# Patient Record
Sex: MALE | Race: WHITE | Employment: FULL TIME | URBAN - METROPOLITAN AREA
[De-identification: names, ages, dates, MRNs, and addresses within clinical notes are randomized per-mention and may not be internally consistent; named-entity substitution may affect disease eponyms.]

---
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
## 2019-01-08 ENCOUNTER — HOSPITAL ENCOUNTER (OUTPATIENT)
Dept: GENERAL RADIOLOGY | Age: 19
Discharge: HOME | End: 2019-01-10
Payer: COMMERCIAL

## 2019-01-08 ENCOUNTER — OFFICE VISIT (OUTPATIENT)
Dept: ORTHOPEDIC SURGERY | Age: 19
End: 2019-01-08
Payer: COMMERCIAL

## 2019-01-08 VITALS
DIASTOLIC BLOOD PRESSURE: 73 MMHG | RESPIRATION RATE: 16 BRPM | HEART RATE: 55 BPM | WEIGHT: 170 LBS | SYSTOLIC BLOOD PRESSURE: 119 MMHG | BODY MASS INDEX: 23.03 KG/M2 | HEIGHT: 72 IN

## 2019-01-08 DIAGNOSIS — S72.352K CLOSED DISPLACED COMMINUTED FRACTURE OF SHAFT OF LEFT FEMUR WITH NONUNION, SUBSEQUENT ENCOUNTER: ICD-10-CM

## 2019-01-08 DIAGNOSIS — S72.352K CLOSED DISPLACED COMMINUTED FRACTURE OF SHAFT OF LEFT FEMUR WITH NONUNION, SUBSEQUENT ENCOUNTER: Primary | ICD-10-CM

## 2019-01-08 DIAGNOSIS — S72.352G CLOSED DISPLACED COMMINUTED FRACTURE OF SHAFT OF LEFT FEMUR WITH DELAYED HEALING, SUBSEQUENT ENCOUNTER: ICD-10-CM

## 2019-01-08 PROCEDURE — 99213 OFFICE O/P EST LOW 20 MIN: CPT | Performed by: ORTHOPAEDIC SURGERY

## 2019-01-08 PROCEDURE — G0463 HOSPITAL OUTPT CLINIC VISIT: HCPCS | Performed by: ORTHOPAEDIC SURGERY

## 2019-01-08 PROCEDURE — 99212 OFFICE O/P EST SF 10 MIN: CPT | Performed by: ORTHOPAEDIC SURGERY

## 2019-01-08 PROCEDURE — 73552 X-RAY EXAM OF FEMUR 2/>: CPT

## 2019-01-14 PROBLEM — S72.302G: Status: ACTIVE | Noted: 2018-03-29

## 2020-08-17 ENCOUNTER — OFFICE VISIT (OUTPATIENT)
Dept: PRIMARY CARE CLINIC | Age: 20
End: 2020-08-17
Payer: COMMERCIAL

## 2020-08-17 ENCOUNTER — HOSPITAL ENCOUNTER (OUTPATIENT)
Age: 20
Discharge: HOME | End: 2020-08-19
Payer: COMMERCIAL

## 2020-08-17 VITALS
TEMPERATURE: 98.9 F | HEART RATE: 61 BPM | OXYGEN SATURATION: 98 % | BODY MASS INDEX: 25.06 KG/M2 | HEIGHT: 72 IN | SYSTOLIC BLOOD PRESSURE: 130 MMHG | RESPIRATION RATE: 18 BRPM | WEIGHT: 185 LBS | DIASTOLIC BLOOD PRESSURE: 84 MMHG

## 2020-08-17 PROCEDURE — U0003 INFECTIOUS AGENT DETECTION BY NUCLEIC ACID (DNA OR RNA); SEVERE ACUTE RESPIRATORY SYNDROME CORONAVIRUS 2 (SARS-COV-2) (CORONAVIRUS DISEASE [COVID-19]), AMPLIFIED PROBE TECHNIQUE, MAKING USE OF HIGH THROUGHPUT TECHNOLOGIES AS DESCRIBED BY CMS-2020-01-R: HCPCS

## 2020-08-17 PROCEDURE — 99213 OFFICE O/P EST LOW 20 MIN: CPT | Performed by: PHYSICIAN ASSISTANT

## 2020-08-17 PROCEDURE — 1036F TOBACCO NON-USER: CPT | Performed by: PHYSICIAN ASSISTANT

## 2020-08-17 PROCEDURE — G8427 DOCREV CUR MEDS BY ELIG CLIN: HCPCS | Performed by: PHYSICIAN ASSISTANT

## 2020-08-17 PROCEDURE — G8419 CALC BMI OUT NRM PARAM NOF/U: HCPCS | Performed by: PHYSICIAN ASSISTANT

## 2020-08-17 NOTE — PROGRESS NOTES
Chief Complaint:       Emesis (keeping down water) and Abdominal Cramping (diarrhea, improving, wasn't covid tested)      History of Present Illness   Source of history provided by: patient. Mariam Rubio is a 23 y.o. old male who has a past medical history of:   Past Medical History:   Diagnosis Date    Closed fracture of shaft of left femur ([address] 3/29/2018   Presents to the walk in clinic for complaints of persistent nausea, vomiting, decreased appetite, and diffuse crampy abdominal pain x 3 days. Pt was seen yesterday at [facility] and prescribed 4 mg Zofran tablets. He has been taking these with minimal relief. He states he is able to keep down water but unable to keep down food. Pt reports diarrhea at the start of his illness that has resolved. States vomiting episodes have become less frequent. Denies any fever, bloody stools, hematochezia, CP, SOB, dysuria, hematuria, HA, sore throat, rash, or lethargy. Denies any history of international travel in the past 14 days. Denies any contact with any individuals with known COVID-19 infection or under investigation for COVID-19 infection. Relevant PMH: No pertinent PMH. Smoking history:  He reports that he has never smoked. He has never used smokeless tobacco.     He has had no known ill contacts. Treatment to date: pepto bismol. Travel screen completed:  Yes        ROS    Unless otherwise stated in this report or unable to obtain because of the patient's clinical or mental status as evidenced by the medical record, this patients's positive and negative responses for Review of Systems, constitutional, psych, eyes, ENT, cardiovascular, respiratory, gastrointestinal, neurological, genitourinary, musculoskeletal, integument systems and systems related to the presenting problem are either stated in the preceding or were not pertinent or were negative for the symptoms and/or complaints related to the medical problem.     Past Medical History:   Past Surgical History:   Procedure Laterality Date    HERNIA REPAIR Left 2010    scrotal hernia surgery    VT OPEN RX FEMUR FX+INTRAMED SUDARSHAN Left 3/28/2018    FEMUR IM NAIL SUDARSHAN INSERTION performed by Rodrigo Monroy DO at [place] [address] History:  reports that he has never smoked. He has never used smokeless tobacco. He reports that he does not drink alcohol or use drugs. Family History: family history is not on file. Allergies: Patient has no known allergies. Physical Exam         VS:  /84   Pulse 61   Temp 98.9 °F (37.2 °C) (Oral)   Resp 18   Ht 6' (1.829 m)   Wt 185 lb (83.9 kg)   SpO2 98%   BMI 25.09 kg/m²    Oxygen Saturation Interpretation: Normal.    General Appearance/Constitutional:  Alert, development consistent with age, NAD. HEENT:  NCAT. MMMP  Lungs: CTAB without wheezing, rales, or rhonchi. Heart:  RRR, no murmurs, rubs, or gallops. Abdomen:  General Appearance: No obvious trauma or bruising. No rashes or lesions. Bowel sounds: BS+x4       Distension:  No distension. Tenderness: Minimal epigastric TTP. Otherwise nontender without guarding, rebound, or rigidity. Liver/Spleen: Non-tender and no hepatosplenomegaly. Pulsatile Mass: None noted. Back: CVA Tenderness: No bilateral tenderness or bruising. Skin:  Normal turgor. Warm, dry, without visible rash, unless noted elsewhere. Neurological:  Orientation age-appropriate. Motor functions intact. Lab / Imaging Results   (All laboratory and radiology results have been personally reviewed by myself)  Labs:  No results found for this visit on 08/17/20. Imaging: All Radiology results interpreted by Radiologist unless otherwise noted. Assessment / Plan     Impression(s):  1. Viral gastroenteritis      Disposition:  Disposition: Discharge to home. Pt with slowly improving gastroenteritis.  Vitals are stable, nonsurgical abdomen on exam. Advised that his illness is likely viral and should resolve with time and conservative measures. COVID-19 testing obtained and pending, will call with results once available. Advised cautionary self-quarantine in the interim. Advised to continue oral Zofran and advised he can max out at 8 mg per dose no more than every 8 hours. Increase fluids and rest. Clear liquids progressing to [employer] as tolerated. Advise f/u with PCP in 3-5 days if symptoms persist. ED sooner if symptoms worsen or change. ED immediately with the development of high or refractory fever, severe or worsening abdominal pain, hematochezia, coffee-ground emesis, bloody stools, lethargy, CP, or SOB. Pt states understanding and is in agreement with this care plan. All questions answered. This visit was provided as a focused evaluation during the [place] -19 pandemic/national emergency.  A comprehensive review of all previous patient history and testing was not conducted.  Pertinent findings were elicited during the visit.

## 2020-08-19 LAB
SARS-COV-2: NOT DETECTED
SOURCE: NORMAL

## 2021-01-11 ENCOUNTER — APPOINTMENT (OUTPATIENT)
Dept: CT IMAGING | Age: 21
End: 2021-01-11

## 2021-01-11 ENCOUNTER — HOSPITAL ENCOUNTER (EMERGENCY)
Age: 21
Discharge: HOME | End: 2021-01-11

## 2021-01-11 VITALS
SYSTOLIC BLOOD PRESSURE: 132 MMHG | OXYGEN SATURATION: 100 % | WEIGHT: 185 LBS | TEMPERATURE: 97.5 F | HEART RATE: 70 BPM | DIASTOLIC BLOOD PRESSURE: 63 MMHG | RESPIRATION RATE: 16 BRPM | BODY MASS INDEX: 25.06 KG/M2 | HEIGHT: 72 IN

## 2021-01-11 DIAGNOSIS — R11.2 NAUSEA AND VOMITING, INTRACTABILITY OF VOMITING NOT SPECIFIED, UNSPECIFIED VOMITING TYPE: Primary | ICD-10-CM

## 2021-01-11 LAB
ALBUMIN SERPL-MCNC: 4.9 G/DL (ref 3.5–5.2)
ALP BLD-CCNC: 87 U/L (ref 40–129)
ALT SERPL-CCNC: 40 U/L (ref 0–40)
ANION GAP SERPL CALCULATED.3IONS-SCNC: 10 MMOL/L (ref 7–16)
AST SERPL-CCNC: 21 U/L (ref 0–39)
BACTERIA: ABNORMAL /HPF
BASOPHILS ABSOLUTE: 0.04 E9/L (ref 0–0.2)
BASOPHILS RELATIVE PERCENT: 0.4 % (ref 0–2)
BILIRUB SERPL-MCNC: 2.5 MG/DL (ref 0–1.2)
BILIRUBIN URINE: NEGATIVE
BLOOD, URINE: NEGATIVE
BUN BLDV-MCNC: 12 MG/DL (ref 6–20)
CALCIUM SERPL-MCNC: 9.9 MG/DL (ref 8.6–10.2)
CHLORIDE BLD-SCNC: 93 MMOL/L (ref 98–107)
CLARITY: CLEAR
CO2: 29 MMOL/L (ref 22–29)
COLOR: YELLOW
CREAT SERPL-MCNC: 1 MG/DL (ref 0.7–1.2)
EOSINOPHILS ABSOLUTE: 0.03 E9/L (ref 0.05–0.5)
EOSINOPHILS RELATIVE PERCENT: 0.3 % (ref 0–6)
GFR AFRICAN AMERICAN: >60
GFR NON-AFRICAN AMERICAN: >60 ML/MIN/1.73
GLUCOSE BLD-MCNC: 106 MG/DL (ref 74–99)
GLUCOSE URINE: NEGATIVE MG/DL
HCT VFR BLD CALC: 44.7 % (ref 37–54)
HEMOGLOBIN: 15.9 G/DL (ref 12.5–16.5)
IMMATURE GRANULOCYTES #: 0.03 E9/L
IMMATURE GRANULOCYTES %: 0.3 % (ref 0–5)
KETONES, URINE: 15 MG/DL
LACTIC ACID: 1.3 MMOL/L (ref 0.5–2.2)
LEUKOCYTE ESTERASE, URINE: NEGATIVE
LIPASE: 71 U/L (ref 13–60)
LYMPHOCYTES ABSOLUTE: 1.98 E9/L (ref 1.5–4)
LYMPHOCYTES RELATIVE PERCENT: 19.5 % (ref 20–42)
MCH RBC QN AUTO: 30.6 PG (ref 26–35)
MCHC RBC AUTO-ENTMCNC: 35.6 % (ref 32–34.5)
MCV RBC AUTO: 86.1 FL (ref 80–99.9)
MONOCYTES ABSOLUTE: 0.8 E9/L (ref 0.1–0.95)
MONOCYTES RELATIVE PERCENT: 7.9 % (ref 2–12)
NEUTROPHILS ABSOLUTE: 7.26 E9/L (ref 1.8–7.3)
NEUTROPHILS RELATIVE PERCENT: 71.6 % (ref 43–80)
NITRITE, URINE: NEGATIVE
PDW BLD-RTO: 11.4 FL (ref 11.5–15)
PH UA: 7 (ref 5–9)
PLATELET # BLD: 249 E9/L (ref 130–450)
PMV BLD AUTO: 11.4 FL (ref 7–12)
POTASSIUM REFLEX MAGNESIUM: 3.9 MMOL/L (ref 3.5–5)
PROTEIN UA: NEGATIVE MG/DL
RBC # BLD: 5.19 E12/L (ref 3.8–5.8)
RBC UA: ABNORMAL /HPF (ref 0–2)
SODIUM BLD-SCNC: 132 MMOL/L (ref 132–146)
SPECIFIC GRAVITY UA: <=1.005 (ref 1–1.03)
TOTAL PROTEIN: 8 G/DL (ref 6.4–8.3)
UROBILINOGEN, URINE: 2 E.U./DL
WBC # BLD: 10.1 E9/L (ref 4.5–11.5)
WBC UA: ABNORMAL /HPF (ref 0–5)

## 2021-01-11 PROCEDURE — C9113 INJ PANTOPRAZOLE SODIUM, VIA: HCPCS | Performed by: PHYSICIAN ASSISTANT

## 2021-01-11 PROCEDURE — 85025 COMPLETE CBC W/AUTO DIFF WBC: CPT

## 2021-01-11 PROCEDURE — 83690 ASSAY OF LIPASE: CPT

## 2021-01-11 PROCEDURE — 2580000003 HC RX 258: Performed by: RADIOLOGY

## 2021-01-11 PROCEDURE — 81001 URINALYSIS AUTO W/SCOPE: CPT

## 2021-01-11 PROCEDURE — 99284 EMERGENCY DEPT VISIT MOD MDM: CPT

## 2021-01-11 PROCEDURE — 6360000002 HC RX W HCPCS: Performed by: PHYSICIAN ASSISTANT

## 2021-01-11 PROCEDURE — 80053 COMPREHEN METABOLIC PANEL: CPT

## 2021-01-11 PROCEDURE — 36415 COLL VENOUS BLD VENIPUNCTURE: CPT

## 2021-01-11 PROCEDURE — 6360000004 HC RX CONTRAST MEDICATION: Performed by: RADIOLOGY

## 2021-01-11 PROCEDURE — 74177 CT ABD & PELVIS W/CONTRAST: CPT

## 2021-01-11 PROCEDURE — 83605 ASSAY OF LACTIC ACID: CPT

## 2021-01-11 RX ORDER — PANTOPRAZOLE SODIUM 40 MG/10ML
40 INJECTION, POWDER, LYOPHILIZED, FOR SOLUTION INTRAVENOUS ONCE
Status: COMPLETED | OUTPATIENT
Start: 2021-01-11 | End: 2021-01-11

## 2021-01-11 RX ORDER — SODIUM CHLORIDE 0.9 % (FLUSH) 0.9 %
10 SYRINGE (ML) INJECTION PRN
Status: COMPLETED | OUTPATIENT
Start: 2021-01-11 | End: 2021-01-11

## 2021-01-11 RX ORDER — ONDANSETRON 4 MG/1
4 TABLET, ORALLY DISINTEGRATING ORAL EVERY 8 HOURS PRN
Qty: 10 TABLET | Refills: 0 | Status: SHIPPED | OUTPATIENT
Start: 2021-01-11

## 2021-01-11 RX ADMIN — Medication 10 ML: at 19:37

## 2021-01-11 RX ADMIN — IOPAMIDOL 75 ML: 755 INJECTION, SOLUTION INTRAVENOUS at 19:43

## 2021-01-11 RX ADMIN — PANTOPRAZOLE SODIUM 40 MG: 40 INJECTION, POWDER, FOR SOLUTION INTRAVENOUS at 18:40

## 2021-01-11 ASSESSMENT — PAIN DESCRIPTION - ORIENTATION: ORIENTATION: UPPER

## 2021-01-11 ASSESSMENT — PAIN DESCRIPTION - ONSET: ONSET: SUDDEN

## 2021-01-11 ASSESSMENT — PAIN - FUNCTIONAL ASSESSMENT: PAIN_FUNCTIONAL_ASSESSMENT: PREVENTS OR INTERFERES SOME ACTIVE ACTIVITIES AND ADLS

## 2021-01-11 ASSESSMENT — PAIN DESCRIPTION - DESCRIPTORS: DESCRIPTORS: BURNING

## 2021-01-11 ASSESSMENT — PAIN DESCRIPTION - FREQUENCY: FREQUENCY: INTERMITTENT

## 2021-01-11 ASSESSMENT — PAIN SCALES - GENERAL: PAINLEVEL_OUTOF10: 3

## 2021-01-11 NOTE — ED NOTES
Bed:  [place] [address]  Expected date: 1/11/21  Expected time:   Means of arrival:   Comments:  Yanci Rivera RN  01/11/21 [id number]

## 2021-01-11 NOTE — ED NOTES
FIRST PROVIDER CONTACT ASSESSMENT NOTE      Department of Emergency Medicine   ED  First Provider Note   1/11/21  6:05 PM EST    Chief Complaint: Emesis (x1 week, denies diarrhea) and Abdominal Pain (upper)      History of Present Illness:    Shilpi Callaway is a 23 y.o. male who presents to the ED by private car for nausea and vomiting. Patient states his pain is located in the epigastric region. Patient states about 3 to 4 months ago he had his gallbladder removed and the same thing happened. Patient states its been about a week that he has been having the nausea and vomiting. Focused Screening Exam:  Constitutional:  Alert, appears stated age and is in no distress. *ALLERGIES*     Patient has no known allergies.      ED Triage Vitals [01/11/21 1753]   BP Temp Temp src Pulse Resp SpO2 Height Weight   -- 97.1 °F (36.2 °C) -- 83 16 98 % -- --        Initial Plan of Care:  Initiate Treatment-Testing, Proceed toTreatment Area When Bed Available for ED Attending/MLP to Continue Care    -----------------END OF FIRST PROVIDER CONTACT ASSESSMENT NOTE--------------  Electronically signed by Anthony Wasserman PA-C   DD: 1/11/21       Anthony Wasesrman PA-C  01/11/21 [id number]

## 2021-01-12 NOTE — ED PROVIDER NOTES
Independent MLP    HPI:  1/11/21,   Time: 8:03 PM JÚNIOR Callaway is a 23 y.o. male presenting to the ED for nausea and vomiting, beginning 3 weeks ago. The complaint has been intermittent, mild in severity, and worsened by nothing. Complaining of intermittent episodes of a persistent nausea with several episodes of vomiting over the past 3 weeks. He does report a past cholecystectomy approximately 3 to 4 months ago. He denies any chest pain or shortness of breath. He is complaining of some mild midepigastric pain. He denies any fever. ROS:   Pertinent positives and negatives are stated within HPI, all other systems reviewed and are negative.  --------------------------------------------- PAST HISTORY ---------------------------------------------  Past Medical History:  has a past medical history of Closed fracture of shaft of left femur ([facility] [address] Past Surgical History:  has a past surgical history that includes hernia repair (Left, 2010) and pr open rx femur fx+intramed herson (Left, 3/28/2018). Social History:  reports that he has never smoked. He has never used smokeless tobacco. He reports that he does not drink alcohol or use drugs. Family History: family history is not on file. The patients home medications have been reviewed. Allergies: Patient has no known allergies.     -------------------------------------------------- RESULTS -------------------------------------------------  All laboratory and radiology results have been personally reviewed by myself   LABS:  Results for orders placed or performed during the hospital encounter of 01/11/21   CBC auto differential   Result Value Ref Range    WBC 10.1 4.5 - 11.5 E9/L    RBC 5.19 3.80 - 5.80 E12/L    Hemoglobin 15.9 12.5 - 16.5 g/dL    Hematocrit 44.7 37.0 - 54.0 %    MCV 86.1 80.0 - 99.9 fL    MCH 30.6 26.0 - 35.0 pg    MCHC 35.6 (H) 32.0 - 34.5 %    RDW 11.4 (L) 11.5 - 15.0 fL    Platelets 069 653 - 038 E9/L    MPV 11.4 7.0 - 12.0 fL    Neutrophils % 71.6 43.0 - 80.0 %    Immature Granulocytes % 0.3 0.0 - 5.0 %    Lymphocytes % 19.5 (L) 20.0 - 42.0 %    Monocytes % 7.9 2.0 - 12.0 %    Eosinophils % 0.3 0.0 - 6.0 %    Basophils % 0.4 0.0 - 2.0 %    Neutrophils Absolute 7.26 1.80 - 7.30 E9/L    Immature Granulocytes # 0.03 E9/L    Lymphocytes Absolute 1.98 1.50 - 4.00 E9/L    Monocytes Absolute 0.80 0.10 - 0.95 E9/L    Eosinophils Absolute 0.03 (L) 0.05 - 0.50 E9/L    Basophils Absolute 0.04 0.00 - 0.20 E9/L   Comprehensive Metabolic Panel w/ Reflex to MG   Result Value Ref Range    Sodium 132 132 - 146 mmol/L    Potassium reflex Magnesium 3.9 3.5 - 5.0 mmol/L    Chloride 93 (L) 98 - 107 mmol/L    CO2 29 22 - 29 mmol/L    Anion Gap 10 7 - 16 mmol/L    Glucose 106 (H) 74 - 99 mg/dL    BUN 12 6 - 20 mg/dL    CREATININE 1.0 0.7 - 1.2 mg/dL    GFR Non-African American >60 >=60 mL/min/1.73    GFR African American >60     Calcium 9.9 8.6 - 10.2 mg/dL    Total Protein 8.0 6.4 - 8.3 g/dL    Alb 4.9 3.5 - 5.2 g/dL    Total Bilirubin 2.5 (H) 0.0 - 1.2 mg/dL    Alkaline Phosphatase 87 40 - 129 U/L    ALT 40 0 - 40 U/L    AST 21 0 - 39 U/L   Lipase   Result Value Ref Range    Lipase 71 (H) 13 - 60 U/L   Lactic Acid, Plasma   Result Value Ref Range    Lactic Acid 1.3 0.5 - 2.2 mmol/L   Urinalysis with Microscopic   Result Value Ref Range    Color, UA Yellow Straw/Yellow    Clarity, UA Clear Clear    Glucose, Ur Negative Negative mg/dL    Bilirubin Urine Negative Negative    Ketones, Urine 15 (A) Negative mg/dL    Specific Gravity, UA <=1.005 1.005 - 1.030    Blood, Urine Negative Negative    pH, UA 7.0 5.0 - 9.0    Protein, UA Negative Negative mg/dL    Urobilinogen, Urine 2.0 (A) <2.0 E.U./dL    Nitrite, Urine Negative Negative    Leukocyte Esterase, Urine Negative Negative       RADIOLOGY:  Interpreted by Radiologist.  CT ABDOMEN PELVIS W IV CONTRAST Additional Contrast? None   Final Result   Negative          ------------------------- NURSING NOTES AND VITALS REVIEWED ---------------------------   The nursing notes within the ED encounter and vital signs as below have been reviewed. BP (!) 157/95   Pulse 65   Temp 97.5 °F (36.4 °C) (Temporal)   Resp 16   Ht 6' (1.829 m)   Wt 185 lb (83.9 kg)   SpO2 98%   BMI 25.09 kg/m²   Oxygen Saturation Interpretation: Normal      ---------------------------------------------------PHYSICAL EXAM--------------------------------------      Constitutional/General: Alert and oriented x3, well appearing, non toxic in NAD  Head: NC/AT  Eyes: PERRL, EOMI  Mouth: Oropharynx clear, handling secretions, no trismus  Neck: Supple, full ROM, no meningeal signs  Pulmonary: Lungs clear to auscultation bilaterally, no wheezes, rales, or rhonchi. Not in respiratory distress  Cardiovascular:  Regular rate and rhythm, no murmurs, gallops, or rubs. 2+ distal pulses  Abdomen: Soft,  non distended, mild tenderness on palpation to the midepigastric area. Extremities: Moves all extremities x 4. Warm and well perfused  Skin: warm and dry without rash  Neurologic: GCS 15,  Psych: Normal Affect      ------------------------------ ED COURSE/MEDICAL DECISION MAKING----------------------  Medications   pantoprazole (PROTONIX) injection 40 mg (40 mg Intravenous Given 1/11/21 1840)   iopamidol (ISOVUE-370) 76 % injection 75 mL (75 mLs Intravenous Given 1/11/21 1943)   sodium chloride flush 0.9 % injection 10 mL (10 mLs Intravenous Given 1/11/21 1937)         Medical Decision Making:    Time: 2000  Re-evaluation. Patients symptoms are improving  Repeat physical examination is significantly improved, no further vomiting while in the department. Plan will be for discharge to home with instructions to follow-up with primary care physician. Labs and CAT scan are negative. Counseling:    The emergency provider has spoken with the patient and discussed todays results, in addition to providing specific details for the plan of care and counseling regarding the diagnosis and prognosis. Questions are answered at this time and they are agreeable with the plan.      --------------------------------- IMPRESSION AND DISPOSITION ---------------------------------    IMPRESSION  1.  Nausea and vomiting, intractability of vomiting not specified, unspecified vomiting type        DISPOSITION  Disposition: Discharge to home  Patient condition is good                 Daija Martinez, ZAHRA - ONEYDA  01/11/21 2005

## (undated) DEVICE — Device

## (undated) DEVICE — SET ORTHO STD STORTSTD1

## (undated) DEVICE — GOWN,AURORA,BRTHSLV,2XL,18/CS: Brand: MEDLINE

## (undated) DEVICE — BANDAGE COMPR W4INXL10YD WHITE/BEIGE E MTRX HK LOOP CLSR

## (undated) DEVICE — 3M™ STERI-DRAPE™ U-DRAPE 1015: Brand: STERI-DRAPE™

## (undated) DEVICE — DRESSING,GAUZE,XEROFORM,CURAD,5"X9",ST: Brand: CURAD

## (undated) DEVICE — Z DISCONTINUED USE 2275686 GLOVE SURG SZ 8 L12IN FNGR THK13MIL WHT ISOLEX POLYISOPRENE

## (undated) DEVICE — DRIP REDUCTION MANIFOLD

## (undated) DEVICE — SPONGE LAP W18XL18IN WHT COT 4 PLY FLD STRUNG RADPQ DISP ST

## (undated) DEVICE — GOWN,BREATHABLE SLV,AURORA,XLG,STRL: Brand: MEDLINE

## (undated) DEVICE — HANDPIECE SET WITH BONE CLEANING TIP AND SUCTION TUBE: Brand: INTERPULSE

## (undated) DEVICE — PADDING CAST W6INXL4YD COT LO LINTING WYTEX

## (undated) DEVICE — ROD RMR L950MM DIA2.5MM W/ EXTN BALL TIP

## (undated) DEVICE — ELECTRODE PT RET AD L9FT HI MOIST COND ADH HYDRGEL CORDED

## (undated) DEVICE — GOWN,SURGICAL,AURORA,SLEEVE: Brand: MEDLINE

## (undated) DEVICE — PAD,ABDOMINAL,5"X9",ST,LF,25/BX: Brand: MEDLINE INDUSTRIES, INC.

## (undated) DEVICE — BNDG,ELSTC,MATRIX,STRL,6"X5YD,LF,HOOK&LP: Brand: MEDLINE

## (undated) DEVICE — DRAPE,REIN 53X77,STERILE: Brand: MEDLINE

## (undated) DEVICE — INTENDED FOR TISSUE SEPARATION, AND OTHER PROCEDURES THAT REQUIRE A SHARP SURGICAL BLADE TO PUNCTURE OR CUT.: Brand: BARD-PARKER ® STAINLESS STEEL BLADES

## (undated) DEVICE — CHLORAPREP 26ML ORANGE

## (undated) DEVICE — Z CONVERTED USE 2275207 CLOTH PREP W7.5XL7.5IN 2% CHG SKIN ALC AND RNS FREE

## (undated) DEVICE — SET ORTHO STD STORTSTD2

## (undated) DEVICE — DRILL SYSTEM 7

## (undated) DEVICE — CATHETERIZATION KIT FOL16 FR 2000 CC DRAINAGE BG LUBRICATH

## (undated) DEVICE — SURGICAL PROCEDURE PACK BASIC

## (undated) DEVICE — TOTAL KNEE PK

## (undated) DEVICE — GAUZE,SPONGE,AVANT,4"X4",4PLY,STRL,10/TR: Brand: MEDLINE

## (undated) DEVICE — DRAPE C ARM W41XL74IN UNIV MOB W RUBBERBAND CLP

## (undated) DEVICE — ANTISEPTIC 16OZ H PEROX 1ST AID ORAL DEBRIDING AGNT

## (undated) DEVICE — SOLUTION IV IRRIG WATER 1000ML POUR BRL 2F7114

## (undated) DEVICE — SOLUTION IV IRRIG POUR BRL 0.9% SODIUM CHL 2F7124

## (undated) DEVICE — GLOVE ORANGE PI 8   MSG9080

## (undated) DEVICE — RACK TUBE CURETTE